# Patient Record
Sex: FEMALE | Race: WHITE | Employment: FULL TIME | URBAN - METROPOLITAN AREA
[De-identification: names, ages, dates, MRNs, and addresses within clinical notes are randomized per-mention and may not be internally consistent; named-entity substitution may affect disease eponyms.]

---

## 2019-01-02 ENCOUNTER — HOSPITAL ENCOUNTER (OUTPATIENT)
Age: 39
Setting detail: OUTPATIENT SURGERY
Discharge: HOME | End: 2019-01-02
Attending: INTERNAL MEDICINE | Admitting: INTERNAL MEDICINE
Payer: COMMERCIAL

## 2019-01-02 VITALS
SYSTOLIC BLOOD PRESSURE: 90 MMHG | HEART RATE: 78 BPM | DIASTOLIC BLOOD PRESSURE: 49 MMHG | BODY MASS INDEX: 21.45 KG/M2 | RESPIRATION RATE: 16 BRPM | OXYGEN SATURATION: 99 % | TEMPERATURE: 98.1 F | WEIGHT: 133.5 LBS | HEIGHT: 66 IN

## 2019-01-02 PROCEDURE — G0500 MOD SEDAT ENDO SERVICE >5YRS: HCPCS | Performed by: INTERNAL MEDICINE

## 2019-01-02 PROCEDURE — 74011250636 HC RX REV CODE- 250/636

## 2019-01-02 PROCEDURE — 88305 TISSUE EXAM BY PATHOLOGIST: CPT

## 2019-01-02 PROCEDURE — 74011000250 HC RX REV CODE- 250: Performed by: INTERNAL MEDICINE

## 2019-01-02 PROCEDURE — 76040000007: Performed by: INTERNAL MEDICINE

## 2019-01-02 PROCEDURE — 77030032490 HC SLV COMPR SCD KNE COVD -B: Performed by: INTERNAL MEDICINE

## 2019-01-02 PROCEDURE — 77030009426 HC FCPS BIOP ENDOSC BSC -B: Performed by: INTERNAL MEDICINE

## 2019-01-02 PROCEDURE — 99153 MOD SED SAME PHYS/QHP EA: CPT | Performed by: INTERNAL MEDICINE

## 2019-01-02 PROCEDURE — 74011250636 HC RX REV CODE- 250/636: Performed by: INTERNAL MEDICINE

## 2019-01-02 RX ORDER — DEXTROMETHORPHAN/PSEUDOEPHED 2.5-7.5/.8
1.2 DROPS ORAL
Status: CANCELLED | OUTPATIENT
Start: 2019-01-02

## 2019-01-02 RX ORDER — DIPHENHYDRAMINE HYDROCHLORIDE 50 MG/ML
50 INJECTION, SOLUTION INTRAMUSCULAR; INTRAVENOUS ONCE
Status: COMPLETED | OUTPATIENT
Start: 2019-01-02 | End: 2019-01-02

## 2019-01-02 RX ORDER — LANOLIN ALCOHOL/MO/W.PET/CERES
CREAM (GRAM) TOPICAL
COMMUNITY

## 2019-01-02 RX ORDER — LIDOCAINE HYDROCHLORIDE 20 MG/ML
5 SOLUTION OROPHARYNGEAL AS NEEDED
COMMUNITY

## 2019-01-02 RX ORDER — EPINEPHRINE 0.1 MG/ML
1 INJECTION INTRACARDIAC; INTRAVENOUS
Status: CANCELLED | OUTPATIENT
Start: 2019-01-02 | End: 2019-01-03

## 2019-01-02 RX ORDER — FENTANYL CITRATE 50 UG/ML
100 INJECTION, SOLUTION INTRAMUSCULAR; INTRAVENOUS
Status: DISCONTINUED | OUTPATIENT
Start: 2019-01-02 | End: 2019-01-02 | Stop reason: HOSPADM

## 2019-01-02 RX ORDER — SODIUM CHLORIDE 0.9 % (FLUSH) 0.9 %
5-10 SYRINGE (ML) INJECTION EVERY 8 HOURS
Status: CANCELLED | OUTPATIENT
Start: 2019-01-02

## 2019-01-02 RX ORDER — ATROPINE SULFATE 0.1 MG/ML
0.5 INJECTION INTRAVENOUS
Status: CANCELLED | OUTPATIENT
Start: 2019-01-02 | End: 2019-01-03

## 2019-01-02 RX ORDER — OMEPRAZOLE 40 MG/1
40 CAPSULE, DELAYED RELEASE ORAL DAILY
COMMUNITY

## 2019-01-02 RX ORDER — ACETAMINOPHEN 325 MG/1
650 TABLET ORAL
COMMUNITY

## 2019-01-02 RX ORDER — FLUMAZENIL 0.1 MG/ML
0.2 INJECTION INTRAVENOUS
Status: DISCONTINUED | OUTPATIENT
Start: 2019-01-02 | End: 2019-01-02 | Stop reason: HOSPADM

## 2019-01-02 RX ORDER — SODIUM CHLORIDE 0.9 % (FLUSH) 0.9 %
5-10 SYRINGE (ML) INJECTION AS NEEDED
Status: CANCELLED | OUTPATIENT
Start: 2019-01-02

## 2019-01-02 RX ORDER — MIDAZOLAM HYDROCHLORIDE 1 MG/ML
.25-5 INJECTION, SOLUTION INTRAMUSCULAR; INTRAVENOUS
Status: DISCONTINUED | OUTPATIENT
Start: 2019-01-02 | End: 2019-01-02 | Stop reason: HOSPADM

## 2019-01-02 RX ORDER — SODIUM CHLORIDE 9 MG/ML
1000 INJECTION, SOLUTION INTRAVENOUS CONTINUOUS
Status: DISCONTINUED | OUTPATIENT
Start: 2019-01-02 | End: 2019-01-02 | Stop reason: HOSPADM

## 2019-01-02 RX ORDER — NALOXONE HYDROCHLORIDE 0.4 MG/ML
0.4 INJECTION, SOLUTION INTRAMUSCULAR; INTRAVENOUS; SUBCUTANEOUS
Status: DISCONTINUED | OUTPATIENT
Start: 2019-01-02 | End: 2019-01-02 | Stop reason: HOSPADM

## 2019-01-02 RX ORDER — HYDROCODONE BITARTRATE AND ACETAMINOPHEN 5; 325 MG/1; MG/1
TABLET ORAL
COMMUNITY

## 2019-01-02 RX ORDER — SUCRALFATE 1 G/1
1 TABLET ORAL 4 TIMES DAILY
COMMUNITY

## 2019-01-02 NOTE — PROCEDURES
[facility]  Esophagogastroduodenoscopy Procedure Report  _______________________________________________________  Patient: Akbar Daily  Attending Physician: Amara Hines MD    Patient ID: [id number]                                     Referring Physician: Dr Maryjane Reid      Exam Date: 1/2/2019   _______________________________________________________      Introduction: A 45 y.o. Female, presents for an Esophagogastroduodenoscopy Procedure    Indication: new onset anemia at 9.2 MCV 92 on 10/22/ 2018. She did not have any rectal bleeding or melena but was c/o RUQ pain. She went to the ER on October 5, 2018 for severe weakness and was found to be anemic. The same thing happened again this last Monday but didn't go to the hospital however on Dec 25 she had severe RUQ pain went to the ER at [facility] where Hgb was 9.4 and Ct scan showed Diffusely thick-walled appearance of the distal gastric antrum, and proximal duodenum, concerning for gastritis/ peptic ulcer disease. No definitive evidence of perforation at this time. Mildly prominent uterine cervix, correlate with direct visualization. Normal caliber appendix. She was put on Omeprazole 40 and Carafate 1 g qid. The retic count is slightly elevated at 2.02 Iron saturation 53 but Ferritin is low at 11. She takes Ibuprofen on rare occasions no more than once a month. smokes few cigarettes a week , no alcohol abuse or history of liver disease. I requested the occult blood in stools and Urea breath test but were not done yet. Menstruations are regular and not particularly heavy. she does not donate blood to the red cross. No other Hgb values are available to me. Her only symptoms are occasional heart burns about once a month. Recent RUQ pain and chronic life long constipation where she has one or 2 bm/ week. For now we are going to check her stomach, the H Pylori. :  Amara Hines MD    Sedation:    Versed 15 mg IV, fentanyl 150 mcg IV, Benadryl 50 mg IV topical Hurricaine spray,     Procedure Details:  After infomed consent was obtained for the procedure, with all risks and benefits of procedure explained the patient was taken to the endoscopy suite and placed in the left lateral decubitus position. Following sequential administration of sedation as per above, the endoscope was inserted into the mouth and advanced under direct vision to third portion of the duodenum. A careful inspection was made as the gastroscope was withdrawn, including a retroflexed view of the proximal stomach; findings and interventions are described below. ASA status: Level: I      FINDINGS:  HYPOPHARYNX AND LARYNX: Normal  Esophagus: Normal proximal, middle and distal esophagus. No Hiatal hernia. Diaphragmatic pinch located at 40 cm. Stomach: No blood, food or liquid retention. Multiple Antral small ulcers with some scarring and inflammatory thickening of the antral mucosa. 5 biopsies taken from the stomach to r/o H. Pylori gastritis. Normal, cardia, fundus, body, lesser curvature, incisura and pylorus. Mucosa is grossly normal.  Duodenum: small ulcer hiding at the distal bulb in the bend but not bleeding and without stigmata. No Duodenitis. The second, third portions and the area of the major papilla are normal     Therapies:    none  biopsy of stomach body, antrum    Specimen:   none           Complications:   None    EBL:  Minimal    IMPRESSION: Multiple antral small ulcers < 1 cm each in the process of healing with mild scarring and mucosal thickening. Small hidden distal bulbar ulcer 11 mm in diameter. She denied taking NSAID's          RECOMMENDATIONS: -Continue acid suppression with a proton pump inhibitor. , -Await pathology. , -Follow symptoms. , -Follow clinical symptoms and laboratory studies for evidence of rebleeding. , -GERD diet: avoid fried and fatty foods.  peppermint, chocolate, alcohol, coffee, citrus fruits and juices, tomoato products; avoid lying down for 2 to 3 hours after eating.   Avoid all NSAID's and ASA  Assistant: none    Karan Ken MD  1/2/2019  9:35 AM

## 2019-01-02 NOTE — DISCHARGE INSTRUCTIONS
Tootie Ellis  [id number]  1980    EGD DISCHARGE INSTRUCTIONS  Discomfort:  Sore throat- throat lozenges or warm salt water gargle  redness at IV site- apply warm compress to area; if redness or soreness persist- contact your physician  Gaseous discomfort- walking, belching will help relieve any discomfort  You may not operate a vehicle until the next day  You may not engage in an occupation involving machinery or appliances until the next day  You may not drink alcoholic beverages until the next day  Avoid making any critical decisions for at least 24 hour    DIET   You may not resume your regular diet. Antireflux diet. ACTIVITY  You may not resume your normal daily activities   Spend the remainder of the day resting -  avoid any strenuous activity. CALL M.D. ANY SIGN OF   Increasing pain, nausea, vomiting  Abdominal distension (swelling)  New increased bleeding (oral or rectal)  Fever (chills)  Pain in chest area  Bloody discharge from nose or mouth  Shortness of breath     You may not take any Advil, Aspirin, Ibuprofen, Motrin, Aleve, or Goodys but only Tylenol as needed for pain. Follow-up Instructions: Follow-up in the office as scheduled or make a follow-up appointment in 2 weeks. Morelia Holcomb MD  January 2, 2019    . DISCHARGE SUMMARY from Nurse    PATIENT INSTRUCTIONS:    After general anesthesia or intravenous sedation, for 24 hours or while taking prescription Narcotics:  · Limit your activities  · Do not drive and operate hazardous machinery  · Do not make important personal or business decisions  · Do  not drink alcoholic beverages  · If you have not urinated within 8 hours after discharge, please contact your surgeon on call.     Report the following to your surgeon:  · Excessive pain, swelling, redness or odor of or around the surgical area  · Temperature over 100.5  · Nausea and vomiting lasting longer than 4 hours or if unable to take medications  · Any signs of decreased circulation or nerve impairment to extremity: change in color, persistent  numbness, tingling, coldness or increase pain  · Any questions    What to do at Home:  Recommended activity: Activity as tolerated and no driving for today,     If you experience any of the following symptoms as above, please follow up with Dr. Dinah Ko. *  Please give a list of your current medications to your Primary Care Provider. *  Please update this list whenever your medications are discontinued, doses are      changed, or new medications (including over-the-counter products) are added. *  Please carry medication information at all times in case of emergency situations. These are general instructions for a healthy lifestyle:    No smoking/ No tobacco products/ Avoid exposure to second hand smoke  Surgeon General's Warning:  Quitting smoking now greatly reduces serious risk to your health. Obesity, smoking, and sedentary lifestyle greatly increases your risk for illness    A healthy diet, regular physical exercise & weight monitoring are important for maintaining a healthy lifestyle    You may be retaining fluid if you have a history of heart failure or if you experience any of the following symptoms:  Weight gain of 3 pounds or more overnight or 5 pounds in a week, increased swelling in our hands or feet or shortness of breath while lying flat in bed. Please call your doctor as soon as you notice any of these symptoms; do not wait until your next office visit. Recognize signs and symptoms of STROKE:    F-face looks uneven    A-arms unable to move or move unevenly    S-speech slurred or non-existent    T-time-call 911 as soon as signs and symptoms begin-DO NOT go       Back to bed or wait to see if you get better-TIME IS BRAIN. Warning Signs of HEART ATTACK     Call 911 if you have these symptoms:   Chest discomfort.  Most heart attacks involve discomfort in the center of the chest that lasts more than a few minutes, or that goes away and comes back. It can feel like uncomfortable pressure, squeezing, fullness, or pain.  Discomfort in other areas of the upper body. Symptoms can include pain or discomfort in one or both arms, the back, neck, jaw, or stomach.  Shortness of breath with or without chest discomfort.  Other signs may include breaking out in a cold sweat, nausea, or lightheadedness. Don't wait more than five minutes to call 911 - MINUTES MATTER! Fast action can save your life. Calling 911 is almost always the fastest way to get lifesaving treatment. Emergency Medical Services staff can begin treatment when they arrive -- up to an hour sooner than if someone gets to the hospital by car. The discharge information has been reviewed with the patient and caregiver. The patient and caregiver verbalized understanding. Discharge medications reviewed with the patient and caregiver and appropriate educational materials and side effects teaching were provided.   Patient armband removed and shredded  ___________________________________________________________________________________________________________________________________

## 2019-01-02 NOTE — PERIOP NOTES
negative urine preg and verified by [facility] President, CNA. Reviewed PTA medication list with patient/caregiver and patient/caregiver denies any additional medications.  Patient admits to having a responsible adult care for them for at least 24 hours after surgery.

## 2019-01-02 NOTE — H&P
This 45year old female presents for Anemia. History of Present Illness: 1. Anemia The anemia began in 2018. The symptom(s) began on 10/05/2018. Type of anemia was acquired for deficiency anemia (iron deficient). The problem is getting worse. Relevant medical history includes history of anemia. Associated symptoms include constipation. Pertinent negatives include abdominal pain, chest pain, chills, cold intolerance, depression, diarrhea, dizziness, headache, joint pain, nausea, numbness, vomiting and weight loss. Additional information: BM 1-2 a week for 10 years. She was anemic when she was pregnant in . No abd surgeries. PGF had colitis. No fm hx of colon cancer or Crohn's. F-Leukemia. BC and Aleve once a month. 10/05/18 she went to [facility] ER because she couldn't stand up. She has never had an EGD. She uses about 5 packets a day of splenda. She smokes 2 to 3 cigarettes on the weekend and uses Nicorette, no ETOH in the last few months. Essure birth control. PROBLEM LIST:   Problem List reviewed. Problem Description Onset Date Chronic Clinical Status Notes Gastroesophageal reflux in child 10/30/2018 N Obstipation 10/30/2018 N Iron deficiency anemia due to dietary causes 10/30/2018 N    
 
 
 
 
 
PAST MEDICAL/SURGICAL HISTORY   (Detailed) Disease/disorder Onset Date Management Date Comments 10/05/2018 Family History  (Detailed) Relationship Family Member Name  Age at Death Condition Onset Age Cause of Death Father    Leukemia  N Mother  N  Alive and well Social History:  (Detailed) Tobacco use reviewed. Preferred language is [place]. Tobacco use status: Occasional cigarette smoker. Smoking status: Light tobacco smoker. SMOKING STATUS Use Status Type Smoking Status Usage Per Day Years Used Total Pack Years  
yes  Light tobacco smoker Medications (active prior to today) Medication Instructions Start Date Stop Date Refilled Elsewhere  
iron 325 mg (65 mg iron) tablet take 1 tablet by oral route 3 times every day //   Y Patient Status Completed with information received for patient in a summary of care record. Medication Reconciliation Medications reconciled today. Medication Reviewed Adherence Medication Name Sig Desc Elsewhere Status  
taking as directed iron 325 mg (65 mg iron) tablet take 1 tablet by oral route 3 times every day Y Verified Medications (Added, Continued or Stopped today) Start Date Medication Directions PRN Status PRN Reason Instruction Stop Date  
 iron 325 mg (65 mg iron) tablet take 1 tablet by oral route 3 times every day N Allergies: 
Ingredient Reaction (Severity) Medication Name Comment NO KNOWN ALLERGIES     
 
ORDERS: 
Status Lab Order Time Frame Comments  
ordered H. pylori Breath Test    
ordered Occult Blood, Fecal, IA    
ordered GASTROENTEROLOGY PROCEDURE within 1 Month at location [facility]. The surgeon scheduled is Reji Tejeda [facility]. An assistant has not been requested. Review of Systems System Neg/Pos Details Constitutional Negative Chills, Fever, Malaise and Weight loss. ENMT Negative Nasal congestion and Sore throat. Eyes Negative Double vision. Respiratory Negative Asthma, Chronic cough and Wheezing. Cardio Negative Chest pain, Edema and Irregular heartbeat/palpitations. GI Positive Constipation, Hemorrhoids. GI Negative Abdominal pain, Change in bowel habits, Decreased appetite, Diarrhea, Dysphagia, Heartburn, Hematemesis, Hematochezia, Melena, Nausea, Reflux and Vomiting.  Negative Dysuria and Hematuria. Endocrine Negative Cold intolerance, Heat intolerance and Increased thirst.  
Neuro Negative Dizziness, Headache, Numbness, Tremors and Vertigo. Psych Negative Anxiety, Depression and Increased stress. Integumentary Negative Hives and Rash. MS Negative Back pain, Joint pain and Myalgia. Hema/Lymph Negative Easy bleeding and Easy bruising. Allergic/Immuno Negative Contact allergy, Food allergies and Seasonal allergies. Vital Signs Height Time ft in cm Last Measured Height Position 2:24 PM 5.0 7.32 170.99 10/30/2018 0 Weight/BSA/BMI Time lb oz kg Context BMI kg/m2 BSA m2  
2:24 .00  60.328 dressed with shoes 20.63 Date/Time Temp Pulse BP MAP (Calculated) Arterial Line 1 BP (mmHg) BP Patient Position Resp SpO2 O2 Device O2 Flow Rate (L/min) Pre/Post Ductal Weight 01/02/19 0853 98.4 °F (36.9 °C) 87 113/56 75   16 100 % Room air   60.6 kg (133 lb 8 oz) PHYSICAL EXAM: 
Exam Findings Details Constitutional Normal No acute distress. Well Nourished. Well developed. Eyes Normal General - Right: Normal, Left: Normal. Conjunctiva - Right: Normal, Left: Normal. Sclera - Right: Normal, Left: Normal. Pupil - Right: Normal, Left: Normal.  
Nose/Mouth/Throat Normal Lips/teeth/gums - Normal. Tongue - Normal. Buccal mucosa - Normal. Palate & uvula - Normal.  
Neck Exam Normal Inspection - Normal. Palpation - Normal. Thyroid gland - Normal. Submandibular lymph nodes - Normal.  
Lymph Detail Normal Submandibular. Anterior cervical. Posterior cervical. Supraclavicular. Respiratory Normal Inspection - Normal. Auscultation - Normal. Percussion - Normal. Cough - Absent. Effort - Normal.  
Cardiovascular Normal Heart rate - Regular rate. Heart sounds - Normal S1, Normal S2. Murmurs - None. Extremities - No edema. Abdomen Normal Inspection - Normal. Appliance(s) - None. Abdominal muscles - Normal. Auscultation - Normal. Percussion - Normal. Anterior palpation - Normal, No guarding, No rebound. CVA tenderness - None. Umbilicus - Normal. No abdominal tenderness. No hepatic enlargement. No splenic enlargement. No hernia. No Ascites. No palpable mass. Núñez's sign - Negative.   
Skin Normal Inspection - Normal.  
 Musculoskeletal Normal Hands - Left: Normal, Right: Normal.  
Extremity Normal No cyanosis. No edema. Clubbing - Absent. Neurological Normal Level of consciousness - Normal. Orientation - Normal. Memory - Normal. Motor - Normal. Balance & gait - Normal. Coordination - Normal. Fine motor skills - Normal. DTRs - Normal.  
Psychiatric Normal Orientation - Oriented to time, place, person & situation. Not anxious. Appropriate mood and affect. Behavior appropriate for age. Sufficient fund of knowledge. Sufficient language. No memory loss. Assessment/Plan # Detail Type Description 1. Assessment Other iron deficiency anemia (D50.8). Patient Plan 46 y/o female referred by Dr. Latesha Flood for consult. new onset anemia at 9.2 MCV 92 on 10/22/ 2018 cause not clear retic count is slightly elevated at 2.02 Iron saturation 53 but Ferritin is low at 11. takes Ibuprofen once a month. smokes few cigarets , day no alcohol abuse or liver disease. I requested the occult blood in stools and Urea breath test but were not done yet. Menstruation is not heavy and does not donate blood to the red cross. No other Hgb values are available 
her only symptoms are occasional heart burns about once a month and chronic life long constipation where she has one or 2 bm/ week 
for now we are going to check her stomach, the H Pylori I think it is reasonable to check first the stomach and if negative then I may consider  doing a colonoscopy but first we need to repeat the CBC Plan Orders H. pylori Breath Test to be performed and Occult Blood, Fecal, IA to be performed. She will be scheduled for GASTROENTEROLOGY PROCEDURE, Next Lab Date is within 1 Month on 11/23/2018.  
    
 2. Assessment Constipation (K59.00). Patient Plan life long constipation bm 1 to 2/ week. needs to treat her self.  
I advised her to consume more water, cooked vegetables and fresh fruits and vegetables. Should not hesitate to Take Miralax on regular basis once or more to control their symptoms but sometimes have to add another laxative or take even an enema if the above do not work. If this is not enough then we can add Linzess or Trulance 3. Assessment GERD w/o esophagitis (K21.9). Patient Plan Onset 1 year ago, symptoms once a month. I gave her explanation and advices about management of GERD: avoid eating for 4 hours before bed time. Lift the head of the bed by 6 inches. Drink in between meals. Eat low fat and spice diet. Avoid reclining, bending over or wearing tight cloths after meal. She should treat her symptoms by Taking Zantac (Ranitidine) 150 mg bid on regular basis and if needed she can try the Omeprazole 20 mg  daily to take 1/2 hour before first meal of the day to get the maximum benefit. Avoid NSAID's  
    
 
NO change. Occult blood in stools and Urea breath tests were not done

## (undated) DEVICE — MEDI-VAC NON-CONDUCTIVE SUCTION TUBING: Brand: CARDINAL HEALTH

## (undated) DEVICE — MAJ-1414 SINGLE USE ADPATER BIOPSY VALV: Brand: SINGLE USE ADAPTOR BIOPSY VALVE

## (undated) DEVICE — KENDALL SCD EXPRESS SLEEVES, KNEE LENGTH, MEDIUM: Brand: KENDALL SCD

## (undated) DEVICE — REM POLYHESIVE ADULT PATIENT RETURN ELECTRODE: Brand: VALLEYLAB

## (undated) DEVICE — MOUTHPIECE ENDOSCP 20X27MM --

## (undated) DEVICE — KENDALL RADIOLUCENT FOAM MONITORING ELECTRODE RECTANGULAR SHAPE: Brand: KENDALL

## (undated) DEVICE — ENDO CARRY-ON PROCEDURE KIT INCLUDES ENZYMATIC SPONGE, GAUZE, BIOHAZARD LABEL, TRAY, LUBRICANT, DIRTY SCOPE LABEL, WATER LABEL, TRAY, DRAWSTRING PAD, AND DEFENDO 4-PIECE KIT.: Brand: ENDO CARRY-ON PROCEDURE KIT

## (undated) DEVICE — SPONGE GZ W4XL4IN COT 12 PLY TYP VII WVN C FLD DSGN

## (undated) DEVICE — FORCEPS BX CAP 240CM L RAD JAW 4

## (undated) DEVICE — TRAP SPEC COLL POLYP POLYSTYR --

## (undated) DEVICE — SINGLE PORT MANIFOLD: Brand: NEPTUNE 2